# Patient Record
Sex: FEMALE | Race: WHITE | NOT HISPANIC OR LATINO | URBAN - METROPOLITAN AREA
[De-identification: names, ages, dates, MRNs, and addresses within clinical notes are randomized per-mention and may not be internally consistent; named-entity substitution may affect disease eponyms.]

---

## 2024-03-28 DIAGNOSIS — M25.562 LEFT KNEE PAIN: Primary | ICD-10-CM

## 2024-05-06 ENCOUNTER — HOSPITAL ENCOUNTER (OUTPATIENT)
Dept: RADIOLOGY | Facility: CLINIC | Age: 39
Discharge: HOME | End: 2024-05-06
Attending: ORTHOPAEDIC SURGERY
Payer: OTHER MISCELLANEOUS

## 2024-05-06 ENCOUNTER — OFFICE VISIT (OUTPATIENT)
Dept: ORTHOPEDICS | Facility: CLINIC | Age: 39
End: 2024-05-06
Payer: OTHER MISCELLANEOUS

## 2024-05-06 VITALS
BODY MASS INDEX: 27.77 KG/M2 | SYSTOLIC BLOOD PRESSURE: 121 MMHG | WEIGHT: 205 LBS | HEIGHT: 72 IN | DIASTOLIC BLOOD PRESSURE: 83 MMHG | HEART RATE: 111 BPM

## 2024-05-06 DIAGNOSIS — M25.562 LEFT KNEE PAIN: ICD-10-CM

## 2024-05-06 DIAGNOSIS — M23.8X2 CHONDRAL DEFECT OF CONDYLE OF LEFT FEMUR: Primary | ICD-10-CM

## 2024-05-06 PROCEDURE — 73564 X-RAY EXAM KNEE 4 OR MORE: CPT | Mod: LT,,, | Performed by: ORTHOPAEDIC SURGERY

## 2024-05-06 PROCEDURE — 99214 OFFICE O/P EST MOD 30 MIN: CPT | Mod: ,,, | Performed by: ORTHOPAEDIC SURGERY

## 2024-05-06 RX ORDER — NALTREXONE HYDROCHLORIDE 50 MG/1
50 TABLET, FILM COATED ORAL DAILY
COMMUNITY
Start: 2024-03-31

## 2024-05-06 RX ORDER — BUPROPION HYDROCHLORIDE 300 MG/1
300 TABLET ORAL EVERY MORNING
COMMUNITY
Start: 2023-12-15

## 2024-05-06 RX ORDER — CETIRIZINE HYDROCHLORIDE 10 MG/1
10 CAPSULE, LIQUID FILLED ORAL DAILY
COMMUNITY

## 2024-05-06 RX ORDER — TRAZODONE HYDROCHLORIDE 100 MG/1
100 TABLET ORAL NIGHTLY PRN
COMMUNITY
Start: 2024-02-12

## 2024-05-06 RX ORDER — LISDEXAMFETAMINE DIMESYLATE 60 MG/1
60 CAPSULE ORAL EVERY MORNING
COMMUNITY
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
## 2024-05-06 NOTE — LETTER
May 6, 2024       [facility]  [address], [address]  [place] [address]  Phone: [phone]  Fax: [phone]       Patient: Sary Perez   YOB: 1985  Date of Visit: 05/06/2024    To Whom It May Concern:    Vaibhav Perez  was at [facility] on 05/06/2024. The patient may not return to work due to needing surgery on her left knee. If you have any questions or concerns, or if I can be of further assistance, please do not hesitate to contact me.    Sincerely,    Dr Roe Rao MD

## 2024-05-14 ENCOUNTER — TELEPHONE (OUTPATIENT)
Dept: ORTHOPEDICS | Facility: CLINIC | Age: 39
End: 2024-05-14
Payer: OTHER MISCELLANEOUS

## 2024-06-12 ENCOUNTER — OFFICE VISIT (OUTPATIENT)
Dept: ORTHOPEDICS | Facility: CLINIC | Age: 39
End: 2024-06-12
Payer: OTHER MISCELLANEOUS

## 2024-06-12 VITALS
SYSTOLIC BLOOD PRESSURE: 130 MMHG | BODY MASS INDEX: 27.77 KG/M2 | WEIGHT: 205 LBS | HEIGHT: 72 IN | DIASTOLIC BLOOD PRESSURE: 77 MMHG | HEART RATE: 97 BPM

## 2024-06-12 DIAGNOSIS — M23.8X2 CHONDRAL DEFECT OF CONDYLE OF LEFT FEMUR: Primary | ICD-10-CM

## 2024-06-12 PROCEDURE — 99214 OFFICE O/P EST MOD 30 MIN: CPT | Mod: ,,, | Performed by: ORTHOPAEDIC SURGERY

## 2024-06-12 RX ORDER — SODIUM CHLORIDE 9 MG/ML
INJECTION, SOLUTION INTRAVENOUS CONTINUOUS
OUTPATIENT
Start: 2024-06-12

## 2024-06-12 NOTE — H&P (VIEW-ONLY)
Chief Complaint:   Chief Complaint   Patient presents with    Left Knee - Pre-op Exam    Pre-op Exam     Pre-op left knee OATS procedure - Surgery 6/20/24 - W/Comp        Consulting Physician: No ref. provider found    History of present illness:    1/19/2023:  Left knee arthroscopic partial lateral meniscectomy, femoral trochlea chondroplasty (Foret)  5/4/2023:  Left knee arthroscopic partial lateral meniscectomy, trochlea DeNovo (Foret)  11/15/2023: Left knee arthroscopic debridement, partial medial and lateral menisectomy, lateral release (Ambrocio)    she is a pleasant 38 y.o. year old female who initially injured her knee in September of 2022 she fell directly onto her left knee.  She had pain and immediate swelling.  She was initially seen in [place] and treated with the above-mentioned surgeries.  She she also had a 3rd surgery in tibia toe.  Unfortunately she has had continued pain and swelling.  She knows the pain around the kneecap and medially and laterally.  She is tried formal physical therapy and medications without relief.  She denies any new numbness or tingling.  She does have occasional intermittent spontaneous ecchymosis to the medial aspect of the knee.    Past Medical History:   Diagnosis Date    ADD (attention deficit disorder)     Anxiety and depression     Tailbone injury     coccyx       Past Surgical History:   Procedure Laterality Date    HYSTERECTOMY  2012    KNEE SURGERY Left     x3       Current Outpatient Medications   Medication Sig    buPROPion (WELLBUTRIN XL) 300 MG 24 hr tablet Take 300 mg by mouth every morning.    cetirizine (ZYRTEC) 10 mg Cap     naltrexone (DEPADE) 50 mg tablet Take by mouth.    traZODone (DESYREL) 100 MG tablet Take 100 mg by mouth.    VYVANSE 60 mg capsule      No current facility-administered medications for this visit.       Review of patient's allergies indicates:   Allergen Reactions    Cat/feline products Hives, Itching, Other (See Comments), Rash  "and Swelling    Penicillins Anaphylaxis    Hay fever and allergy relief Anxiety, Itching, Other (See Comments), Photosensitivity and Rash       Family History   Problem Relation Name Age of Onset    Alcohol abuse Mother Chasity     Depression Mother Chasity     Drug abuse Mother Chasity     Early death Mother Chasity     Miscarriages / Stillbirths Mother Chasity     Alcohol abuse Father Huey     Drug abuse Father Huey     Alcohol abuse Maternal Grandfather Wesly     Arthritis Maternal Grandmother Carissa     Asthma Maternal Grandmother Carissa     Depression Maternal Grandmother Carissa     Diabetes Maternal Grandmother Carissa     Alcohol abuse Maternal Aunt Noemi     Drug abuse Maternal Aunt Noemi     Alcohol abuse Maternal Uncle Huey     Drug abuse Maternal Uncle Huey        Social History     Socioeconomic History    Marital status: Unknown   Tobacco Use    Smoking status: Never    Smokeless tobacco: Never   Substance and Sexual Activity    Alcohol use: Never    Drug use: Never    Sexual activity: Yes     Partners: Male     Birth control/protection: Other-see comments     Comment: Hystrorectomy       Review of Systems:    Constitution:   Denies chills, fever, and sweats.  HENT:   Denies headaches or blurry vision.  Cardiovascular:  Denies chest pain or irregular heart beat.  Respiratory:   Denies cough or shortness of breath.  Gastrointestinal:  Denies abdominal pain, nausea, or vomiting.  Musculoskeletal:   Denies muscle cramps.  Neurological:   Denies dizziness or focal weakness.  Psychiatric/Behavior: Normal mental status.  Hematology/Lymph:  Denies bleeding problem or easy bruising/bleeding.  Skin:    Denies rash or suspicious lesions.    Examination:    Vital Signs:    Vitals:    06/12/24 1251 06/12/24 1254   BP: 130/77    Pulse: 97    Weight: 93 kg (205 lb 0.4 oz)    Height: 6' 0.01" (1.829 m)    PainSc:    5       Body mass index is 27.8 kg/m².    Constitution:   Well-developed, well nourished patient in no acute " distress.  Neurological:   Alert and oriented x 3 and cooperative to examination.     Psychiatric/Behavior: Normal mental status.  Respiratory:   No shortness of breath.  Cards:   Pulses palpable, brisk cap refill  Eyes:    Extraoccular muscles intact  Skin:    No scars, rash or suspicious lesions.    MSK:   Standing exam  stance: normal alignment, no significant leg-length discrepancy  gait:  Antalgic    Knee examination  - General comments:  Quad atrophy  - Tenderness:  Patella and medial and lateral joint lines    Knee                  RIGHT    LEFT  Skin:                  Intact      Intact  ROM:                 0-130      0-130  Effusion:             Neg        +  MJL TTP:           Neg         +  LJL TTP:            Neg         +  Ramon:         Neg         Neg  Pat crep:            Neg         Neg  Patella TTPs:     Neg         +  Patella grind:      Neg        +  Lachman:           Neg        Neg  Pivot shift:          Neg        Neg  Valgus stress:    Neg        Neg  Varus stress:      Neg        Neg  Posterior drawer: Neg       Neg    N-V intact intact  Hip: nml nml    Lower extremity edema:Negative       Assessment: 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  -     Place in Outpatient; Standing  -     Case Request Operating Room: REPAIR, KNEE, ARTHROSCOPIC, WITH OSTEOCHONDRAL GRAFT TRANSFER - allograft  -     Vital signs; Standing  -     Cleanse with Chlorhexidine (CHG); Standing  -     Diet NPO; Standing  -     Chlorohexidine Gluconate Bath; Standing  -     Full code; Standing  -     Place sequential compression device; Standing    Other orders  -     0.9%  NaCl infusion  -     IP VTE LOW RISK PATIENT; Standing  -     clindamycin (Cleocin) 900 mg in dextrose 5 % (D5W) 50 mL IVPB        Plan:  Recommend left knee arthroscopic assisted osteochondral allograft transplant.  We discussed the details of the procedure and expected postoperative course.  We discussed the benefits of surgery which be to decrease  her pain and increase her function.  We discussed the risks of surgery which is small but could be significant if she has pain, stiffness, or infection.  After discussion she would like to proceed.  Plan for surgery 6/20

## 2024-06-12 NOTE — PROGRESS NOTES
Chief Complaint:   Chief Complaint   Patient presents with    Left Knee - Pre-op Exam    Pre-op Exam     Pre-op left knee OATS procedure - Surgery 6/20/24 - W/Comp        Consulting Physician: No ref. provider found    History of present illness:    1/19/2023:  Left knee arthroscopic partial lateral meniscectomy, femoral trochlea chondroplasty (Foret)  5/4/2023:  Left knee arthroscopic partial lateral meniscectomy, trochlea DeNovo (Foret)  11/15/2023: Left knee arthroscopic debridement, partial medial and lateral menisectomy, lateral release (Ambrocio)    she is a pleasant 38 y.o. year old female who initially injured her knee in September of 2022 she fell directly onto her left knee.  She had pain and immediate swelling.  She was initially seen in [place] and treated with the above-mentioned surgeries.  She she also had a 3rd surgery in tibia toe.  Unfortunately she has had continued pain and swelling.  She knows the pain around the kneecap and medially and laterally.  She is tried formal physical therapy and medications without relief.  She denies any new numbness or tingling.  She does have occasional intermittent spontaneous ecchymosis to the medial aspect of the knee.    Past Medical History:   Diagnosis Date    ADD (attention deficit disorder)     Anxiety and depression     Tailbone injury     coccyx       Past Surgical History:   Procedure Laterality Date    HYSTERECTOMY  2012    KNEE SURGERY Left     x3       Current Outpatient Medications   Medication Sig    buPROPion (WELLBUTRIN XL) 300 MG 24 hr tablet Take 300 mg by mouth every morning.    cetirizine (ZYRTEC) 10 mg Cap     naltrexone (DEPADE) 50 mg tablet Take by mouth.    traZODone (DESYREL) 100 MG tablet Take 100 mg by mouth.    VYVANSE 60 mg capsule      No current facility-administered medications for this visit.       Review of patient's allergies indicates:   Allergen Reactions    Cat/feline products Hives, Itching, Other (See Comments), Rash  "and Swelling    Penicillins Anaphylaxis    Hay fever and allergy relief Anxiety, Itching, Other (See Comments), Photosensitivity and Rash       Family History   Problem Relation Name Age of Onset    Alcohol abuse Mother Chasity     Depression Mother Chasity     Drug abuse Mother Chasity     Early death Mother Chasity     Miscarriages / Stillbirths Mother Chasity     Alcohol abuse Father Huey     Drug abuse Father Huey     Alcohol abuse Maternal Grandfather Wesly     Arthritis Maternal Grandmother Carissa     Asthma Maternal Grandmother Carissa     Depression Maternal Grandmother Carissa     Diabetes Maternal Grandmother Carissa     Alcohol abuse Maternal Aunt Noemi     Drug abuse Maternal Aunt Noemi     Alcohol abuse Maternal Uncle uHey     Drug abuse Maternal Uncle Huey        Social History     Socioeconomic History    Marital status: Unknown   Tobacco Use    Smoking status: Never    Smokeless tobacco: Never   Substance and Sexual Activity    Alcohol use: Never    Drug use: Never    Sexual activity: Yes     Partners: Male     Birth control/protection: Other-see comments     Comment: Hystrorectomy       Review of Systems:    Constitution:   Denies chills, fever, and sweats.  HENT:   Denies headaches or blurry vision.  Cardiovascular:  Denies chest pain or irregular heart beat.  Respiratory:   Denies cough or shortness of breath.  Gastrointestinal:  Denies abdominal pain, nausea, or vomiting.  Musculoskeletal:   Denies muscle cramps.  Neurological:   Denies dizziness or focal weakness.  Psychiatric/Behavior: Normal mental status.  Hematology/Lymph:  Denies bleeding problem or easy bruising/bleeding.  Skin:    Denies rash or suspicious lesions.    Examination:    Vital Signs:    Vitals:    06/12/24 1251 06/12/24 1254   BP: 130/77    Pulse: 97    Weight: 93 kg (205 lb 0.4 oz)    Height: 6' 0.01" (1.829 m)    PainSc:    5       Body mass index is 27.8 kg/m².    Constitution:   Well-developed, well nourished patient in no acute " distress.  Neurological:   Alert and oriented x 3 and cooperative to examination.     Psychiatric/Behavior: Normal mental status.  Respiratory:   No shortness of breath.  Cards:   Pulses palpable, brisk cap refill  Eyes:    Extraoccular muscles intact  Skin:    No scars, rash or suspicious lesions.    MSK:   Standing exam  stance: normal alignment, no significant leg-length discrepancy  gait:  Antalgic    Knee examination  - General comments:  Quad atrophy  - Tenderness:  Patella and medial and lateral joint lines    Knee                  RIGHT    LEFT  Skin:                  Intact      Intact  ROM:                 0-130      0-130  Effusion:             Neg        +  MJL TTP:           Neg         +  LJL TTP:            Neg         +  Ramon:         Neg         Neg  Pat crep:            Neg         Neg  Patella TTPs:     Neg         +  Patella grind:      Neg        +  Lachman:           Neg        Neg  Pivot shift:          Neg        Neg  Valgus stress:    Neg        Neg  Varus stress:      Neg        Neg  Posterior drawer: Neg       Neg    N-V intact intact  Hip: nml nml    Lower extremity edema:Negative       Assessment: 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  -     Place in Outpatient; Standing  -     Case Request Operating Room: REPAIR, KNEE, ARTHROSCOPIC, WITH OSTEOCHONDRAL GRAFT TRANSFER - allograft  -     Vital signs; Standing  -     Cleanse with Chlorhexidine (CHG); Standing  -     Diet NPO; Standing  -     Chlorohexidine Gluconate Bath; Standing  -     Full code; Standing  -     Place sequential compression device; Standing    Other orders  -     0.9%  NaCl infusion  -     IP VTE LOW RISK PATIENT; Standing  -     clindamycin (Cleocin) 900 mg in dextrose 5 % (D5W) 50 mL IVPB        Plan:  Recommend left knee arthroscopic assisted osteochondral allograft transplant.  We discussed the details of the procedure and expected postoperative course.  We discussed the benefits of surgery which be to decrease  her pain and increase her function.  We discussed the risks of surgery which is small but could be significant if she has pain, stiffness, or infection.  After discussion she would like to proceed.  Plan for surgery 6/20

## 2024-06-14 ENCOUNTER — ANESTHESIA EVENT (OUTPATIENT)
Dept: SURGERY | Facility: HOSPITAL | Age: 39
End: 2024-06-14
Payer: OTHER MISCELLANEOUS

## 2024-06-20 ENCOUNTER — ANESTHESIA (OUTPATIENT)
Dept: SURGERY | Facility: HOSPITAL | Age: 39
End: 2024-06-20
Payer: OTHER MISCELLANEOUS

## 2024-06-20 ENCOUNTER — HOSPITAL ENCOUNTER (OUTPATIENT)
Facility: HOSPITAL | Age: 39
Discharge: HOME | End: 2024-06-20
Attending: ORTHOPAEDIC SURGERY | Admitting: ORTHOPAEDIC SURGERY
Payer: OTHER MISCELLANEOUS

## 2024-06-20 DIAGNOSIS — M23.8X2 CHONDRAL DEFECT OF CONDYLE OF LEFT FEMUR: Primary | ICD-10-CM

## 2024-06-20 PROCEDURE — 71000015 HC POSTOP RECOV 1ST HR: Performed by: ORTHOPAEDIC SURGERY

## 2024-06-20 PROCEDURE — 63600175 PHARM REV CODE 636 W HCPCS: Performed by: ANESTHESIOLOGY

## 2024-06-20 PROCEDURE — 36000710: Performed by: ORTHOPAEDIC SURGERY

## 2024-06-20 PROCEDURE — 63600175 PHARM REV CODE 636 W HCPCS: Performed by: NURSE ANESTHETIST, CERTIFIED REGISTERED

## 2024-06-20 PROCEDURE — 27201423 OPTIME MED/SURG SUP & DEVICES STERILE SUPPLY: Performed by: ORTHOPAEDIC SURGERY

## 2024-06-20 PROCEDURE — 63600175 PHARM REV CODE 636 W HCPCS: Performed by: ORTHOPAEDIC SURGERY

## 2024-06-20 PROCEDURE — C1713 ANCHOR/SCREW BN/BN,TIS/BN: HCPCS | Performed by: ORTHOPAEDIC SURGERY

## 2024-06-20 PROCEDURE — 37000008 HC ANESTHESIA 1ST 15 MINUTES: Performed by: ORTHOPAEDIC SURGERY

## 2024-06-20 PROCEDURE — 63600175 PHARM REV CODE 636 W HCPCS: Mod: JZ,JG | Performed by: NURSE PRACTITIONER

## 2024-06-20 PROCEDURE — 36000711: Performed by: ORTHOPAEDIC SURGERY

## 2024-06-20 PROCEDURE — 29867 ALLGRFT IMPLNT KNEE W/SCOPE: CPT | Mod: AS,LT,, | Performed by: NURSE PRACTITIONER

## 2024-06-20 PROCEDURE — 64447 NJX AA&/STRD FEMORAL NRV IMG: CPT | Performed by: ANESTHESIOLOGY

## 2024-06-20 PROCEDURE — 29867 ALLGRFT IMPLNT KNEE W/SCOPE: CPT | Mod: LT,,, | Performed by: ORTHOPAEDIC SURGERY

## 2024-06-20 PROCEDURE — 25000003 PHARM REV CODE 250: Performed by: ORTHOPAEDIC SURGERY

## 2024-06-20 PROCEDURE — 71000016 HC POSTOP RECOV ADDL HR: Performed by: ORTHOPAEDIC SURGERY

## 2024-06-20 PROCEDURE — 25000003 PHARM REV CODE 250: Performed by: NURSE ANESTHETIST, CERTIFIED REGISTERED

## 2024-06-20 PROCEDURE — 37000009 HC ANESTHESIA EA ADD 15 MINS: Performed by: ORTHOPAEDIC SURGERY

## 2024-06-20 PROCEDURE — 63600175 PHARM REV CODE 636 W HCPCS

## 2024-06-20 PROCEDURE — 71000033 HC RECOVERY, INTIAL HOUR: Performed by: ORTHOPAEDIC SURGERY

## 2024-06-20 RX ORDER — PROMETHAZINE HYDROCHLORIDE 25 MG/1
25 TABLET ORAL EVERY 6 HOURS PRN
Status: DISCONTINUED | OUTPATIENT
Start: 2024-06-20 | End: 2024-06-20 | Stop reason: HOSPADM

## 2024-06-20 RX ORDER — HYDROMORPHONE HYDROCHLORIDE 2 MG/ML
0.4 INJECTION, SOLUTION INTRAMUSCULAR; INTRAVENOUS; SUBCUTANEOUS EVERY 5 MIN PRN
Status: DISCONTINUED | OUTPATIENT
Start: 2024-06-20 | End: 2024-06-20 | Stop reason: HOSPADM

## 2024-06-20 RX ORDER — SODIUM CHLORIDE 0.9 % (FLUSH) 0.9 %
3 SYRINGE (ML) INJECTION EVERY 6 HOURS PRN
Status: DISCONTINUED | OUTPATIENT
Start: 2024-06-20 | End: 2024-06-20 | Stop reason: HOSPADM

## 2024-06-20 RX ORDER — DEXAMETHASONE SODIUM PHOSPHATE 4 MG/ML
INJECTION, SOLUTION INTRA-ARTICULAR; INTRALESIONAL; INTRAMUSCULAR; INTRAVENOUS; SOFT TISSUE
Status: DISCONTINUED | OUTPATIENT
Start: 2024-06-20 | End: 2024-06-20

## 2024-06-20 RX ORDER — MORPHINE SULFATE 4 MG/ML
3 INJECTION, SOLUTION INTRAMUSCULAR; INTRAVENOUS
Status: DISCONTINUED | OUTPATIENT
Start: 2024-06-20 | End: 2024-06-20 | Stop reason: HOSPADM

## 2024-06-20 RX ORDER — ROPIVACAINE HYDROCHLORIDE 5 MG/ML
30 INJECTION, SOLUTION EPIDURAL; INFILTRATION; PERINEURAL ONCE
Status: DISCONTINUED | OUTPATIENT
Start: 2024-06-20 | End: 2024-06-20 | Stop reason: HOSPADM

## 2024-06-20 RX ORDER — METHOCARBAMOL 750 MG/1
750 TABLET, FILM COATED ORAL 3 TIMES DAILY
Qty: 21 TABLET | Refills: 0 | Status: SHIPPED | OUTPATIENT
Start: 2024-06-20

## 2024-06-20 RX ORDER — HYDROMORPHONE HYDROCHLORIDE 2 MG/ML
INJECTION, SOLUTION INTRAMUSCULAR; INTRAVENOUS; SUBCUTANEOUS
Status: COMPLETED
Start: 2024-06-20 | End: 2024-06-20

## 2024-06-20 RX ORDER — LIDOCAINE HYDROCHLORIDE 20 MG/ML
INJECTION, SOLUTION EPIDURAL; INFILTRATION; INTRACAUDAL; PERINEURAL
Status: DISCONTINUED | OUTPATIENT
Start: 2024-06-20 | End: 2024-06-20

## 2024-06-20 RX ORDER — CLINDAMYCIN PHOSPHATE 900 MG/50ML
900 INJECTION, SOLUTION INTRAVENOUS
Status: COMPLETED | OUTPATIENT
Start: 2024-06-20 | End: 2024-06-20

## 2024-06-20 RX ORDER — PROPOFOL 10 MG/ML
INJECTION, EMULSION INTRAVENOUS
Status: DISCONTINUED | OUTPATIENT
Start: 2024-06-20 | End: 2024-06-20

## 2024-06-20 RX ORDER — EPINEPHRINE 1 MG/ML
INJECTION, SOLUTION, CONCENTRATE INTRAVENOUS
Status: DISCONTINUED | OUTPATIENT
Start: 2024-06-20 | End: 2024-06-20 | Stop reason: HOSPADM

## 2024-06-20 RX ORDER — GLYCOPYRROLATE 0.2 MG/ML
INJECTION INTRAMUSCULAR; INTRAVENOUS
Status: DISCONTINUED | OUTPATIENT
Start: 2024-06-20 | End: 2024-06-20

## 2024-06-20 RX ORDER — HYDROCODONE BITARTRATE AND ACETAMINOPHEN 5; 325 MG/1; MG/1
1 TABLET ORAL EVERY 4 HOURS PRN
Status: DISCONTINUED | OUTPATIENT
Start: 2024-06-20 | End: 2024-06-20 | Stop reason: HOSPADM

## 2024-06-20 RX ORDER — MIDAZOLAM HYDROCHLORIDE 1 MG/ML
2 INJECTION INTRAMUSCULAR; INTRAVENOUS ONCE
Status: COMPLETED | OUTPATIENT
Start: 2024-06-20 | End: 2024-06-20

## 2024-06-20 RX ORDER — VANCOMYCIN HYDROCHLORIDE 500 MG/10ML
INJECTION, POWDER, LYOPHILIZED, FOR SOLUTION INTRAVENOUS
Status: DISCONTINUED | OUTPATIENT
Start: 2024-06-20 | End: 2024-06-20 | Stop reason: HOSPADM

## 2024-06-20 RX ORDER — ROPIVACAINE HYDROCHLORIDE 5 MG/ML
INJECTION, SOLUTION EPIDURAL; INFILTRATION; PERINEURAL
Status: COMPLETED | OUTPATIENT
Start: 2024-06-20 | End: 2024-06-20

## 2024-06-20 RX ORDER — TRAMADOL HYDROCHLORIDE 50 MG/1
50 TABLET ORAL EVERY 4 HOURS PRN
Status: DISCONTINUED | OUTPATIENT
Start: 2024-06-20 | End: 2024-06-20 | Stop reason: HOSPADM

## 2024-06-20 RX ORDER — KETOROLAC TROMETHAMINE 10 MG/1
10 TABLET, FILM COATED ORAL 3 TIMES DAILY
Qty: 15 TABLET | Refills: 0 | Status: SHIPPED | OUTPATIENT
Start: 2024-06-20

## 2024-06-20 RX ORDER — KETOROLAC TROMETHAMINE 30 MG/ML
INJECTION, SOLUTION INTRAMUSCULAR; INTRAVENOUS
Status: DISCONTINUED | OUTPATIENT
Start: 2024-06-20 | End: 2024-06-20

## 2024-06-20 RX ORDER — MIDAZOLAM HYDROCHLORIDE 1 MG/ML
INJECTION INTRAMUSCULAR; INTRAVENOUS
Status: DISCONTINUED | OUTPATIENT
Start: 2024-06-20 | End: 2024-06-20

## 2024-06-20 RX ORDER — ONDANSETRON HYDROCHLORIDE 2 MG/ML
INJECTION, SOLUTION INTRAVENOUS
Status: DISCONTINUED | OUTPATIENT
Start: 2024-06-20 | End: 2024-06-20

## 2024-06-20 RX ORDER — OXYCODONE AND ACETAMINOPHEN 5; 325 MG/1; MG/1
1 TABLET ORAL EVERY 6 HOURS PRN
Qty: 20 TABLET | Refills: 0 | Status: SHIPPED | OUTPATIENT
Start: 2024-06-20 | End: 2024-06-26 | Stop reason: SDUPTHER

## 2024-06-20 RX ORDER — FENTANYL CITRATE 50 UG/ML
INJECTION, SOLUTION INTRAMUSCULAR; INTRAVENOUS
Status: DISCONTINUED | OUTPATIENT
Start: 2024-06-20 | End: 2024-06-20

## 2024-06-20 RX ORDER — ONDANSETRON HYDROCHLORIDE 2 MG/ML
4 INJECTION, SOLUTION INTRAVENOUS EVERY 12 HOURS PRN
Status: DISCONTINUED | OUTPATIENT
Start: 2024-06-20 | End: 2024-06-20 | Stop reason: HOSPADM

## 2024-06-20 RX ORDER — SODIUM CHLORIDE 9 MG/ML
INJECTION, SOLUTION INTRAVENOUS CONTINUOUS
Status: DISCONTINUED | OUTPATIENT
Start: 2024-06-20 | End: 2024-06-20 | Stop reason: HOSPADM

## 2024-06-20 RX ORDER — MIDAZOLAM HYDROCHLORIDE 2 MG/2ML
INJECTION, SOLUTION INTRAMUSCULAR; INTRAVENOUS
Status: COMPLETED
Start: 2024-06-20 | End: 2024-06-20

## 2024-06-20 RX ORDER — MUPIROCIN 20 MG/G
OINTMENT TOPICAL 2 TIMES DAILY
Status: DISCONTINUED | OUTPATIENT
Start: 2024-06-20 | End: 2024-06-20 | Stop reason: HOSPADM

## 2024-06-20 RX ORDER — VANCOMYCIN HYDROCHLORIDE 1 G/20ML
INJECTION, POWDER, LYOPHILIZED, FOR SOLUTION INTRAVENOUS
Status: DISCONTINUED
Start: 2024-06-20 | End: 2024-06-20 | Stop reason: HOSPADM

## 2024-06-20 RX ADMIN — MIDAZOLAM HYDROCHLORIDE 2 MG: 1 INJECTION, SOLUTION INTRAMUSCULAR; INTRAVENOUS at 09:06

## 2024-06-20 RX ADMIN — DEXAMETHASONE SODIUM PHOSPHATE 8 MG: 4 INJECTION, SOLUTION INTRA-ARTICULAR; INTRALESIONAL; INTRAMUSCULAR; INTRAVENOUS; SOFT TISSUE at 10:06

## 2024-06-20 RX ADMIN — KETOROLAC TROMETHAMINE 30 MG: 30 INJECTION, SOLUTION INTRAMUSCULAR at 11:06

## 2024-06-20 RX ADMIN — LIDOCAINE HYDROCHLORIDE 50 MG: 20 INJECTION, SOLUTION EPIDURAL; INFILTRATION; INTRACAUDAL; PERINEURAL at 10:06

## 2024-06-20 RX ADMIN — GLYCOPYRROLATE 0.1 MG: 0.2 INJECTION INTRAMUSCULAR; INTRAVENOUS at 10:06

## 2024-06-20 RX ADMIN — ONDANSETRON HYDROCHLORIDE 4 MG: 2 SOLUTION INTRAMUSCULAR; INTRAVENOUS at 11:06

## 2024-06-20 RX ADMIN — PROPOFOL 200 MG: 10 INJECTION, EMULSION INTRAVENOUS at 10:06

## 2024-06-20 RX ADMIN — HYDROMORPHONE HYDROCHLORIDE 0.4 MG: 2 INJECTION, SOLUTION INTRAMUSCULAR; INTRAVENOUS; SUBCUTANEOUS at 12:06

## 2024-06-20 RX ADMIN — HYDROMORPHONE HYDROCHLORIDE 2 MG: 2 INJECTION, SOLUTION INTRAMUSCULAR; INTRAVENOUS; SUBCUTANEOUS at 12:06

## 2024-06-20 RX ADMIN — ROPIVACAINE HYDROCHLORIDE 25 ML: 5 INJECTION, SOLUTION EPIDURAL; INFILTRATION; PERINEURAL at 09:06

## 2024-06-20 RX ADMIN — MIDAZOLAM HYDROCHLORIDE 2 MG: 1 INJECTION INTRAMUSCULAR; INTRAVENOUS at 09:06

## 2024-06-20 RX ADMIN — CLINDAMYCIN PHOSPHATE 900 MG: 900 INJECTION, SOLUTION INTRAVENOUS at 10:06

## 2024-06-20 RX ADMIN — FENTANYL CITRATE 50 MCG: 50 INJECTION, SOLUTION INTRAMUSCULAR; INTRAVENOUS at 11:06

## 2024-06-20 RX ADMIN — SODIUM CHLORIDE, SODIUM GLUCONATE, SODIUM ACETATE, POTASSIUM CHLORIDE AND MAGNESIUM CHLORIDE: 526; 502; 368; 37; 30 INJECTION, SOLUTION INTRAVENOUS at 10:06

## 2024-06-20 RX ADMIN — HYDROCODONE BITARTRATE AND ACETAMINOPHEN 1 TABLET: 5; 325 TABLET ORAL at 12:06

## 2024-06-20 RX ADMIN — MIDAZOLAM 2 MG: 1 INJECTION INTRAMUSCULAR; INTRAVENOUS at 10:06

## 2024-06-20 NOTE — ANESTHESIA PROCEDURE NOTES
Intubation    Date/Time: 6/20/2024 10:43 AM    Performed by: Vincent Chang CRNA  Authorized by: Felipe Corley MD    Intubation:     Induction:  Intravenous    Intubated:  Postinduction    Mask Ventilation:  Easy with oral airway    Attempts:  1    Attempted By:  CRNA    Difficult Airway Encountered?: No      Complications:  None    Airway Device:  Supraglottic airway/LMA    Airway Device Size:  3.0    Style/Cuff Inflation:  Cuffed    Secured at:  The lips    Placement Verified By:  Capnometry    Complicating Factors:  None

## 2024-06-20 NOTE — ANESTHESIA PREPROCEDURE EVALUATION
06/20/2024  Sary Perez is a 38 y.o., female.  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     I have reviewed the Nursing Notes. I have reviewed the NPO Status.   I have reviewed the Medications.     Review of Systems  Anesthesia Hx:  No problems with previous Anesthesia                Hematology/Oncology:  Hematology Normal   Oncology Normal                                   EENT/Dental:  EENT/Dental Normal           Cardiovascular:  Cardiovascular Normal Exercise tolerance: good                   Functional Capacity good / => 4 METS                         Pulmonary:  Pulmonary Normal                       Renal/:   Denies Chronic Renal Disease.                Hepatic/GI:  Hepatic/GI Normal                 Musculoskeletal:  Musculoskeletal Normal                Neurological:  Neurology Normal                                      Endocrine:  Endocrine Normal          Denies Morbid Obesity / BMI > 40  Dermatological:  Skin Normal    Psych:  Psychiatric Normal                    Physical Exam  General: Alert, Oriented, Well nourished and Cooperative    Airway:  Mallampati: II   Mouth Opening: Normal  TM Distance: Normal  Tongue: Normal  Neck ROM: Normal ROM    Dental:  Intact    Chest/Lungs:  Clear to auscultation, Normal Respiratory Rate    Heart:  Rate: Normal  Rhythm: Regular Rhythm        Anesthesia Plan  Type of Anesthesia, risks & benefits discussed:    Anesthesia Type: Gen  Supraglottic Airway  Intra-op Monitoring Plan: Standard ASA Monitors  Post Op Pain Control Plan: multimodal analgesia  Induction:  IV and Inhalation  Airway Plan: Direct  Informed Consent: Informed consent signed with the Patient and all parties understand the risks and agree with anesthesia plan.  All questions answered. Patient consented to blood products? Yes  ASA Score: 1  Day of Surgery Review of History & Physical: H&P Update referred to the surgeon/provider.I have interviewed and examined the patient. I have reviewed the patient's H&P dated: There are no significant changes.   Anesthesia Plan Notes: Lft adductor canal blk for po pain controll    Ready For Surgery From Anesthesia Perspective.     .

## 2024-06-20 NOTE — OP NOTE
DATE OF SERVICE: 06/20/2024    SURGEON: Roe Rao MD     ASSISTANT: Abi Currie NP. Mrs. Currie was essential in manipulating the leg while I performed the arthroscopy, retraction, and closure.    PREOPERATIVE DIAGNOSIS:   Left knee trochlea osteochondral defect    POSTOPERATIVE DIAGNOSIS:   Left knee trochlea osteochondral defect, patella chondromalacia,     PROCEDURE PERFORMED:  1. Left knee arthroscopic assisted osteochodral allograft transfer  2. Left knee arthroscopic shaving chondroplasty of patella    ESTIMATED BLOOD LOSS: 10cc.    COMPLICATIONS: None.    TOURNIQUET TIME: 40 minutes.    ANTIBIOTICS: Clindamycin     INDICATIONS FOR PROCEDURE: Sary Perez is a 38 y.o. year old who has had ongoing left knee pain. The patient has had to limit activity due to intermittent pain & occasional mechanical symptoms. An MRI was performed that showed a persistent chondral defect to the trochlea. The patient decided to opt for surgery after failing conservative management.    OPERATIVE REPORT: Sary Perez was initially seen in the preoperative holding area where history and physical were reviewed without change. The operative leg was marked, consents were reviewed, and any questions were answered for the patient and family. The patient was then taken back to the operating room, placed supine on the operating table with all bony prominences padded. Then a nonsterile tourniquet was placed around the upper thigh. The patient was induced under general anesthesia. The operative lower extremity was prepped and draped in standard sterile fashion. A timeout led by the surgeon was performed, and preoperative antibiotics were given. The limp was exsanguinated with gravity. The tourniquet was raised to 100 mmHg over the systolic blood pressure.    The knee was then flexed and the inferolateral portal was created with an #11 blade scalpel.    The camera was introduced, using the blunt trocar, into the suprapatellar pouch.  The undersurface of the patella was found to have a chondral flap at the inferior pole and the trochlear groove was found to have 10 mm focal grade 3/4 chondral wear . The camera was then taken down into the lateral gutter, where no loose bodies and no plica were found. The camera was then brought into the medial gutter, where no loose bodies and no plica were seen. The camera was then brought into the medial compartment.    An inferomedial portal was then localized with a spinal needle, and created using an #11 blade. The medial meniscus was probed and found to have a no tears. The medial femoral condyle was found to have scattered grade 1/2 wear. The medial tibial plateau demonstrated no wear.    The camera was then turned to the notch, where the ACL was found to be intact.    Then the leg was brought into figure-of-four position. The camera was brought into the lateral compartment. The lateral meniscus was probed and found to be status post menisectomy with no residual tear. The lateral femoral condyle was found to have no chondral wear. The lateral tibial plateau had no chondral wear.    The camera was brought up into the patellofemoral joint once more, with the knee in extension and shaving chondroplasty was performed on the patella until smooth and stable cartilage margins were present.    I then was able to localize the defect of the trochlea using a spinal needle and made approximately 2 cm incision in line with the defect.  I split the patella tendon and then I introduced the Reamer into the knee.  I was able to ream 10 mm in diameter and 10 mm in length which corresponded to the length of the plug.  I then used a Pulsavac in order to clean the plug appropriately and then deployed the plug into the defect using arthroscopic assistance.  The plug was probed and stable.    The knee was once more examined for loose bodies, of which none were found. The knee was then evacuated of all fluids. The portals were  closed with 3-0 monocyrl interrupted sutures and steristrips. 10mL of 0.25% Bupivicaine were infiltrated around each portal. A sterile dressing was placed, and the tourniquet was deflated after 40 minutes. The patient was awakened from anesthesia and taken to the postoperative care unit in stable condition.

## 2024-06-20 NOTE — ANESTHESIA POSTPROCEDURE EVALUATION
Anesthesia Post Evaluation    Patient: Sary Perez    Procedure(s) Performed: Procedure(s) (LRB):  REPAIR, KNEE, ARTHROSCOPIC, WITH OSTEOCHONDRAL GRAFT TRANSFER - allograft (Left)    Final Anesthesia Type: general      Patient location during evaluation: PACU  Patient participation: Yes- Able to Participate  Level of consciousness: awake and alert and oriented  Post-procedure vital signs: reviewed and stable  Pain management: adequate  Airway patency: patent  ANAMARIA mitigation strategies: Verification of full reversal of neuromuscular block  PONV status at discharge: No PONV  Anesthetic complications: no      Cardiovascular status: blood pressure returned to baseline and stable  Respiratory status: spontaneous ventilation and unassisted  Hydration status: euvolemic  Follow-up not needed.  Comments: [facility]              Vitals Value Taken Time   /73 06/20/24 1231   Temp  06/20/24 1243   Pulse 69 06/20/24 1242   Resp 18 06/20/24 1230   SpO2 100 % 06/20/24 1242   Vitals shown include unfiled device data.      Event Time   Out of Recovery 12:29:00         Pain/Vidhi Score: Pain Rating Prior to Med Admin: 7 (6/20/2024 12:10 PM)  Vidhi Score: 10 (6/20/2024 12:20 PM)

## 2024-06-20 NOTE — DISCHARGE SUMMARY
[facility] - Periop Services  Discharge Note  Short Stay    Procedure(s) (LRB):  REPAIR, KNEE, ARTHROSCOPIC, WITH OSTEOCHONDRAL GRAFT TRANSFER - allograft (Left)      OUTCOME: Patient tolerated treatment/procedure well without complication and is now ready for discharge.    DISPOSITION: Home or Self Care    FINAL DIAGNOSIS:  <principal problem not specified>    FOLLOWUP: In clinic    DISCHARGE INSTRUCTIONS:  No discharge procedures on file.     TIME SPENT ON DISCHARGE: 10 minutes

## 2024-06-20 NOTE — DISCHARGE INSTRUCTIONS
[facility]  Roe Rao Jr., MD  [address], [address],   [place] [address]  [phone], fax [phone]    CARTILAGE TRANSPLANT    DIET:  Following general anesthesia, start with clear liquids to decrease chances of nausea.  Begin with water, coffee, tea, ginger ale, sprite, or apple juice.  If tolerated, advance to Jell-o, soup, crackers, or toast.  Once these are tolerated, advance to a regular diet.    DRESSING:  Keep the dressing clean and dry.  Remove the dressing on the 3rd day after surgery and replace the gauze with bandaids.  If you have steri-strips or band-aids in place of stitches, allow them to stay in place as long as possible.  They usually fall off on their own within 7-10 days.  You may trim the edges as the steri-strips begin to curl.  Steri-strips can get wet in the shower-pat dry with a towel after showers.    SHOWERING:  You may shower 5 days after surgery.  Remove the dressing or band-aids before showering.  Leave the incisions open to air after showering.  You can cover the sutures with band-aids if clothing irritates the stitches.  You do not need to reapply a dressing, but you may do so if you continue to have drainage.  It is not uncommon to have drainage a few days after surgery.      ACTIVITY:  -  Ice should be applied to the knee for 30 minutes, 5-6 times per day, for the 1st week to help decrease pain and swelling.  After the first week, apply as needed, especially after exercises and physical therapy.  -  Elevate the knee with 2-3 pillows under the ANKLE.  Do not elevate with pillows under the knee, this will keep the knee in a bent position.  It is important that the knee can be fully straightened in the early post op period.  -  Use crutches following surgery  -  You will begin to bear weight on your leg with therapy.      PAIN MEDICATION:  -  Use the Percocet as prescribed for pain after surgery.  Pain medicine can cause nausea  and vomiting, especially on an empty stomach.  -  In addition, you can take Ketorolac as prescribed or Iburpofen 200 mg, 4 pills every 8 hours.  Ketorolac or Iburpofen medicine can irritate your stomach or cause heartburn.  If this happens to you, stop taking the medicine.  -  In addition, you can take Robaxin to help with muscle spasms.  -  Ice and elevation are more useful than pain medicine for surgical pain.  If you are having too much pain or discomfort, try more ice and higher elevation.  -  Do NOT drink alcohol while taking pain medication.    BLOOD CLOT PREVENTION:  If you are aware that you are at high risk for blood blots, notify your physician.  In general, you should walk s much as possible after surgery to increase blood circulation throughout your body. Please take Aspirin 81 mg, 1 pill twice a day for 2 weeks to help further prevent blood clots.    DRIVING OR FLYING:  You must NEVER drive while taking narcotic pain medication  You may ride in a car, take a train, or fly once you feel comfortable    PHYSICAL THERAPY:  Physical therapy will contact you 1-2 days after surgery to schedule a rehab appointment.  All exercises and activities must be within the protocol until rehab is complete.      WORK OR SCHOOL:  You may return to an office job or school whenever comfortable.  Most patients return ~ 1 week after surgery.  For more active jobs that require extended walking, squatting, or lifting, you can wait until after your follow up appointment.  Any other types of jobs should be discussed with Dr. Rao to determine a date for return to work.    PROBLEMS TO REPORT:       1.  Fever greater than 102 F       2.  Incision that is very red and/or draining pus       3.  Unable to urinate within 8 hours of surgery (a rare effect of being put to sleep for surgery)  Calls should be directed to the clinic:  [phone]    RETURN APPOINTMENT:  To confirm or reschedule your appointment, call [phone]

## 2024-06-20 NOTE — TRANSFER OF CARE
Anesthesia Transfer of Care Note    Patient: Sary Perez    Procedure(s) Performed: Procedure(s) (LRB):  REPAIR, KNEE, ARTHROSCOPIC, WITH OSTEOCHONDRAL GRAFT TRANSFER - allograft (Left)    Patient location: PACU    Anesthesia Type: general    Transport from OR: Transported from OR on room air with adequate spontaneous ventilation    Post pain: adequate analgesia    Post assessment: no apparent anesthetic complications    Post vital signs: stable    Level of consciousness: sedated    Nausea/Vomiting: no nausea/vomiting    Complications: none    Transfer of care protocol was followed      Last vitals: Visit Vitals  /72   Pulse 72   Temp 36.2 °C (97.1 °F) (Tympanic)   Resp 16   Ht 6' (1.829 m)   Wt 93.2 kg (205 lb 7.5 oz)   LMP  (LMP Unknown)   SpO2 100%   Breastfeeding No   BMI 27.87 kg/m²

## 2024-06-20 NOTE — ANESTHESIA PROCEDURE NOTES
Peripheral Block/LFT ADDUCTOR CANAL BLK    Patient location during procedure: pre-op   Block not for primary anesthetic.  Reason for block: at surgeon's request and post-op pain management   Post-op Pain Location: lft knee   Start time: 6/20/2024 9:17 AM  Timeout: 6/20/2024 9:14 AM   End time: 6/20/2024 9:18 AM    Staffing  Authorizing Provider: Felipe Corley MD  Performing Provider: Felipe Corley MD    Staffing  Performed by: Felipe Corley MD  Authorized by: Felipe Corley MD    Preanesthetic Checklist  Completed: patient identified, IV checked, site marked, risks and benefits discussed, surgical consent, monitors and equipment checked, pre-op evaluation and timeout performed  Peripheral Block  Patient position: supine  Prep: ChloraPrep and site prepped and draped  Patient monitoring: heart rate, cardiac monitor, continuous pulse ox, continuous capnometry and frequent blood pressure checks  Block type: adductor canal  Laterality: left  Injection technique: single shot  Needle  Needle type: Stimuplex   Needle gauge: 22 G  Needle length: 2 in  Needle localization: anatomical landmarks and ultrasound guidance  Catheter type: spring wound  Catheter size: 19 G  Test dose: lidocaine 1.5% with Epi 1-to-200,000 and negative   -ultrasound image captured on disc.  Assessment  Injection assessment: negative aspiration, negative parasthesia and local visualized surrounding nerve  Paresthesia pain: none  Heart rate change: no  Slow fractionated injection: yes  Pain Tolerance: comfortable throughout block and no complaints  Medications:    Medications: ropivacaine (NAROPIN) injection 0.5% - Perineural, Other   25 mL - 6/20/2024 9:17:00 AM    Additional Notes  VSS TOLERATED PROCEDURE WELL

## 2024-06-20 NOTE — PLAN OF CARE
Pt ready for discharge. Tolerated procedure well.  Problem: Adult Inpatient Plan of Care  Goal: Plan of Care Review  Outcome: Met  Goal: Patient-Specific Goal (Individualized)  Outcome: Met  Goal: Absence of Hospital-Acquired Illness or Injury  Outcome: Met  Goal: Optimal Comfort and Wellbeing  Outcome: Met  Goal: Readiness for Transition of Care  Outcome: Met     Problem: Wound  Goal: Optimal Coping  Outcome: Met  Goal: Optimal Functional Ability  Outcome: Met  Goal: Absence of Infection Signs and Symptoms  Outcome: Met  Goal: Improved Oral Intake  Outcome: Met  Goal: Optimal Pain Control and Function  Outcome: Met  Goal: Skin Health and Integrity  Outcome: Met  Goal: Optimal Wound Healing  Outcome: Met

## 2024-06-21 VITALS
SYSTOLIC BLOOD PRESSURE: 111 MMHG | HEART RATE: 72 BPM | WEIGHT: 205.5 LBS | HEIGHT: 72 IN | DIASTOLIC BLOOD PRESSURE: 75 MMHG | RESPIRATION RATE: 18 BRPM | BODY MASS INDEX: 27.83 KG/M2 | OXYGEN SATURATION: 100 % | TEMPERATURE: 97 F

## 2024-06-26 DIAGNOSIS — Z98.890 S/P ARTHROSCOPIC KNEE SURGERY: Primary | ICD-10-CM

## 2024-06-26 RX ORDER — OXYCODONE AND ACETAMINOPHEN 5; 325 MG/1; MG/1
1 TABLET ORAL EVERY 6 HOURS PRN
Qty: 28 TABLET | Refills: 0 | Status: SHIPPED | OUTPATIENT
Start: 2024-06-26

## 2024-06-27 ENCOUNTER — TELEPHONE (OUTPATIENT)
Dept: ORTHOPEDICS | Facility: CLINIC | Age: 39
End: 2024-06-27
Payer: OTHER MISCELLANEOUS

## 2024-06-27 NOTE — TELEPHONE ENCOUNTER
Patient called with numbness - Left chin into foot and toes. Spoke to Etier feels like the numbness may be residual from the block and feels like this will subside. Patient notified and was okay with giving it more time.     Advised patient to call me if symptoms change or she needs to come sooner than her scheduled appointment.

## 2024-07-08 ENCOUNTER — HOSPITAL ENCOUNTER (OUTPATIENT)
Dept: RADIOLOGY | Facility: CLINIC | Age: 39
Discharge: HOME | End: 2024-07-08
Attending: ORTHOPAEDIC SURGERY

## 2024-07-08 ENCOUNTER — OFFICE VISIT (OUTPATIENT)
Dept: ORTHOPEDICS | Facility: CLINIC | Age: 39
End: 2024-07-08
Payer: OTHER MISCELLANEOUS

## 2024-07-08 VITALS
DIASTOLIC BLOOD PRESSURE: 83 MMHG | WEIGHT: 205.5 LBS | SYSTOLIC BLOOD PRESSURE: 121 MMHG | HEIGHT: 72 IN | BODY MASS INDEX: 27.83 KG/M2 | HEART RATE: 96 BPM

## 2024-07-08 DIAGNOSIS — M23.8X1 CHONDRAL DEFECT OF CONDYLE OF RIGHT FEMUR: Primary | ICD-10-CM

## 2024-07-08 DIAGNOSIS — G89.29 CHRONIC PAIN OF RIGHT KNEE: ICD-10-CM

## 2024-07-08 DIAGNOSIS — M25.561 CHRONIC PAIN OF RIGHT KNEE: ICD-10-CM

## 2024-07-08 RX ORDER — BISACODYL 5 MG
5 TABLET, DELAYED RELEASE (ENTERIC COATED) ORAL DAILY PRN
COMMUNITY

## 2024-07-08 RX ORDER — NAPROXEN SODIUM 220 MG/1
81 TABLET, FILM COATED ORAL DAILY
COMMUNITY

## 2024-07-08 RX ORDER — FLUTICASONE PROPIONATE 50 UG/1
2 SPRAY, METERED NASAL 2 TIMES DAILY
COMMUNITY
Start: 2024-06-10

## 2024-07-08 NOTE — LETTER
July 8, 2024    Sary Perez  [address] [address]          [facility]  [address], [address]  [place] [address]  Phone: [phone]  Fax: [phone] July 8, 2024     Patient: Sary Perez   YOB: 1985   Date of Visit: 7/8/2024       To Whom It May Concern:    It is my medical opinion that Sary Perez should remain out of work due to her knee injury.    If you have any questions or concerns, please don't hesitate to call.    Sincerely,        Roe Rao Jr., MD

## 2024-07-08 NOTE — PROGRESS NOTES
"Chief Complaint:   Chief Complaint   Patient presents with    Work Related Injury     Patient here to address and establish care with right knee only today with work comp . She is also being followed by Dr. Rao for the left knee however this visit/auth only covers the right knee today. She was previously seen for right knee with Dr. Gaspar and Dr. Sin. Previously had cartilage graft sx in 2020 and multiple Synvisc injections- last injection over 6 months ago. She is still having sharp, locking up, and " catching."       Consulting Physician: No ref. provider found    History of present illness:    she is a pleasant 38 y.o. year old female who has had continued pain over the right knee.  She originally injured the right knee back in 2019.  She was twisting the knee while reaching down for her firearm and felt a pop and swelling around the right knee.  She would difficulty in ambulation of the time.  She was previously seen by Dr. Gaspar and underwent a attempted meniscal repair and then a partial meniscectomy.  She also underwent a cartilage procedure and has had a couple of Synvisc injections the last of which was 15 months ago or so.  She has had some persistent pain around the right knee both medial and lateral.  She notices some sharp pain around the kneecap.  She occasionally has not lock up or catch on her.    Past Medical History:   Diagnosis Date    ADD (attention deficit disorder)     Anxiety and depression     Tailbone injury     coccyx    Torn meniscus        Past Surgical History:   Procedure Laterality Date    ARTHROTOMY OF KNEE Right     BLADDER SUSPENSION      perineum repair    HYSTERECTOMY  2012    partial    KNEE ARTHROSCOPY Right     KNEE ARTHROSCOPY W/ OATS PROCEDURE Left 6/20/2024    Procedure: REPAIR, KNEE, ARTHROSCOPIC, WITH OSTEOCHONDRAL GRAFT TRANSFER - allograft;  Surgeon: Roe Rao Jr., MD;  Location: [facility];  Service: Orthopedics;  Laterality: Left;    KNEE SURGERY Left  " "   x 3 (1-mensicus repair 2- cartilage filler 3- mensicus removal       Current Outpatient Medications   Medication Sig    aspirin 81 MG Chew Take 81 mg by mouth once daily.    bisacodyL (DULCOLAX) 5 mg EC tablet Take 5 mg by mouth daily as needed for Constipation.    buPROPion (WELLBUTRIN XL) 300 MG 24 hr tablet Take 300 mg by mouth every morning.    cetirizine (ZYRTEC) 10 mg Cap Take 10 mg by mouth Daily.    FLONASE ALLERGY RELIEF 50 mcg/actuation nasal spray 2 sprays by Each Nostril route 2 (two) times daily.    ketorolac (TORADOL) 10 mg tablet Take 1 tablet (10 mg total) by mouth 3 (three) times daily.    methocarbamoL (ROBAXIN) 750 MG Tab Take 1 tablet (750 mg total) by mouth 3 (three) times daily.    naltrexone (DEPADE) 50 mg tablet Take 50 mg by mouth once daily.    oxyCODONE-acetaminophen (PERCOCET) 5-325 mg per tablet Take 1 tablet by mouth every 6 (six) hours as needed for Pain.    traZODone (DESYREL) 100 MG tablet Take 100 mg by mouth nightly as needed.    VYVANSE 60 mg capsule Take 60 mg by mouth every morning.     No current facility-administered medications for this visit.       Review of patient's allergies indicates:   Allergen Reactions    Cat/feline products Hives, Itching, Other (See Comments), Rash and Swelling    Penicillins Shortness Of Breath     From childhood "stop breathing"    Hay fever and allergy relief Anxiety, Itching, Other (See Comments), Photosensitivity and Rash       Family History   Problem Relation Name Age of Onset    Alcohol abuse Mother Chasity     Depression Mother Chasity     Drug abuse Mother Chasity     Early death Mother Chasity     Miscarriages / Stillbirths Mother Chasity     Alcohol abuse Father Huey     Drug abuse Father Huey     Alcohol abuse Maternal Grandfather Wesly     Arthritis Maternal Grandmother Carissa     Asthma Maternal Grandmother Carissa     Depression Maternal Grandmother Carissa     Diabetes Maternal Grandmother Carissa     Alcohol abuse Maternal Aunt Noemi     Drug abuse " Maternal Aunt Noemi     Alcohol abuse Maternal Uncle Huey     Drug abuse Maternal Uncle Huey        Social History     Socioeconomic History    Marital status: Unknown   Tobacco Use    Smoking status: Never    Smokeless tobacco: Never   Substance and Sexual Activity    Alcohol use: Never    Drug use: Never    Sexual activity: Yes     Partners: Male     Birth control/protection: Other-see comments     Comment: Hystrorectomy       Review of Systems:    Constitution:   Denies chills, fever, and sweats.  HENT:   Denies headaches or blurry vision.  Cardiovascular:  Denies chest pain or irregular heart beat.  Respiratory:   Denies cough or shortness of breath.  Gastrointestinal:  Denies abdominal pain, nausea, or vomiting.  Musculoskeletal:   Denies muscle cramps.  Neurological:   Denies dizziness or focal weakness.  Psychiatric/Behavior: Normal mental status.  Hematology/Lymph:  Denies bleeding problem or easy bruising/bleeding.  Skin:    Denies rash or suspicious lesions.    Examination:    Vital Signs:    Vitals:    07/08/24 1022   BP: 121/83   Pulse: 96   Weight: 93.2 kg (205 lb 7.5 oz)   Height: 6' (1.829 m)       Body mass index is 27.87 kg/m².    Constitution:   Well-developed, well nourished patient in no acute distress.  Neurological:   Alert and oriented x 3 and cooperative to examination.     Psychiatric/Behavior: Normal mental status.  Respiratory:   No shortness of breath.  Cards:   Pulses palpable, brisk cap refill  Eyes:    Extraoccular muscles intact  Skin:    No scars, rash or suspicious lesions.    MSK:   Standing exam  stance: normal alignment, no significant leg-length discrepancy  gait: antalgic    Knee examination  - General comments:  Healed arthroscopic and medial parapatellar incision.    - Tenderness:  Parapatellar    Knee                  RIGHT    LEFT  Skin:                  Intact      Intact  ROM:                 0-130      0-130  Effusion:             +             Neg  MJL TTP:            Neg         Neg  LJL TTP:            Neg         Neg  Ramon:         Neg         Neg  Pat crep:             +            Neg  Patella TTPs:       +           Neg  Patella grind:        +          Neg  Lachman:           Neg        Neg  Pivot shift:          Neg        Neg  Valgus stress:    Neg        Neg  Varus stress:      Neg        Neg  Posterior drawer: Neg       Neg    N-V intact intact  Hip: nml nml    Lower extremity edema:Negative     Imaging: X-rays ordered and images interpreted today personally by me of four views of the right knee show normal bony alignment.       Assessment: Chondral defect of condyle of right femur  -     X-Ray Knee Complete 4 Or More Views Right; Future; Expected date: 07/08/2024        Plan:  We will get MRI to evaluate her right knee.  I will see him back afterward for review

## 2024-07-12 ENCOUNTER — OFFICE VISIT (OUTPATIENT)
Dept: ORTHOPEDICS | Facility: CLINIC | Age: 39
End: 2024-07-12
Payer: OTHER MISCELLANEOUS

## 2024-07-12 VITALS
HEIGHT: 72 IN | SYSTOLIC BLOOD PRESSURE: 122 MMHG | HEART RATE: 106 BPM | WEIGHT: 202 LBS | BODY MASS INDEX: 27.36 KG/M2 | DIASTOLIC BLOOD PRESSURE: 85 MMHG

## 2024-07-12 DIAGNOSIS — M23.8X2 CHONDRAL DEFECT OF CONDYLE OF LEFT FEMUR: Primary | ICD-10-CM

## 2024-07-12 NOTE — PROGRESS NOTES
Chief Complaint:   Chief Complaint   Patient presents with    Left Knee - Follow-up     4 weeks sp Lt knee scope 6/20/24 gl 9/18/24, WC claim number ends in 40, still hurting but getting better, still wakes her up at night, wearing a stabilzing brace with crutches, PT 2 times a week, has extra fishing wire, when swelling toes go numb, 7/3/24 fell at PT wet floor and crutch slipped from under her        History of present illness:  06/20/2024: Left knee arthroscopic trochlea OATS    She returns today.  Her pain is improving.  She is working in therapy.  She has been using the brace and crutches.    Musculoskeletal:   Incisions healed.  Distally she is neurovascularly intact    Imaging:        Assessment: 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        Plan:  Doing well status post above.  Continue rehab.  I will see her back in 4 weeks with radiographs of the left knee

## 2024-07-12 NOTE — LETTER
July 12, 2024       [facility]  [address], [address]  [place] [address]  Phone: [phone]  Fax: [phone]       Patient: Sary Perez   YOB: 1985  Date of Visit: 07/12/2024    To Whom It May Concern:    Vaibhav Perez  was at [facility] on 07/12/2024. The patient is unable to return to work at this time. Follow up appointment 8/12/24. If you have any questions or concerns, or if I can be of further assistance, please do not hesitate to contact me.    Sincerely,    Roe Rao M.D.

## 2024-07-19 DIAGNOSIS — M23.8X1 CHONDRAL DEFECT OF CONDYLE OF RIGHT FEMUR: Primary | ICD-10-CM

## 2024-08-12 ENCOUNTER — HOSPITAL ENCOUNTER (OUTPATIENT)
Dept: RADIOLOGY | Facility: CLINIC | Age: 39
Discharge: HOME | End: 2024-08-12
Attending: ORTHOPAEDIC SURGERY
Payer: OTHER MISCELLANEOUS

## 2024-08-12 ENCOUNTER — OFFICE VISIT (OUTPATIENT)
Dept: ORTHOPEDICS | Facility: CLINIC | Age: 39
End: 2024-08-12
Payer: OTHER MISCELLANEOUS

## 2024-08-12 VITALS
DIASTOLIC BLOOD PRESSURE: 84 MMHG | HEIGHT: 72 IN | WEIGHT: 201.94 LBS | BODY MASS INDEX: 27.35 KG/M2 | HEART RATE: 78 BPM | SYSTOLIC BLOOD PRESSURE: 128 MMHG

## 2024-08-12 DIAGNOSIS — M25.562 LEFT KNEE PAIN, UNSPECIFIED CHRONICITY: ICD-10-CM

## 2024-08-12 DIAGNOSIS — M23.8X2 CHONDRAL DEFECT OF CONDYLE OF LEFT FEMUR: Primary | ICD-10-CM

## 2024-08-12 PROCEDURE — 99024 POSTOP FOLLOW-UP VISIT: CPT | Mod: ,,, | Performed by: ORTHOPAEDIC SURGERY

## 2024-08-12 PROCEDURE — 73560 X-RAY EXAM OF KNEE 1 OR 2: CPT | Mod: LT,,, | Performed by: ORTHOPAEDIC SURGERY

## 2024-08-12 NOTE — LETTER
August 12, 2024       [facility]  [address], [address]  [place] [address]  Phone: [phone]  Fax: [phone]       Patient: Sary Perez   YOB: 1985  Date of Visit: 08/12/2024    To Whom It May Concern:    Vaibhav Perez  was at [facility] on 08/12/2024. The patient may not return to work. Follow up appointment is 9/23/24. If you have any questions or concerns, or if I can be of further assistance, please do not hesitate to contact me.    Sincerely,    Dr Roe Rao MD

## 2024-08-12 NOTE — PROGRESS NOTES
Chief Complaint:   Chief Complaint   Patient presents with    Left Knee - Follow-up     7 wks lt knee scope sx 6/20/24- Reports swelling and bruising in knee. Is ambulating with crutch and brace. Stated stiffness occurs when in one position for too long. Has a question about antiinflammatory with another doctor.        History of present illness:  06/20/2024: Left knee arthroscopic trochlea OATS    She returns today.  Her pain is improving.  She is working in therapy.  She has been using the brace and one crutch.    Musculoskeletal:   Incisions healed.  Range of motion nearly full.  Distally she is neurovascularly intact    Imaging:  Two views left knee show appropriate implant position.       Assessment: 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    Left knee pain, unspecified chronicity  -     Cancel: X-Ray Knee Complete 4 or More Views Left; Future; Expected date: 08/12/2024        Plan:  Doing well status post above.  Continue rehab.  I will see her back in 6 weeks for range-of-motion check.

## 2024-08-19 ENCOUNTER — OFFICE VISIT (OUTPATIENT)
Dept: ORTHOPEDICS | Facility: CLINIC | Age: 39
End: 2024-08-19
Payer: OTHER MISCELLANEOUS

## 2024-08-19 VITALS
WEIGHT: 201.94 LBS | SYSTOLIC BLOOD PRESSURE: 142 MMHG | DIASTOLIC BLOOD PRESSURE: 90 MMHG | HEIGHT: 72 IN | HEART RATE: 103 BPM | BODY MASS INDEX: 27.35 KG/M2

## 2024-08-19 DIAGNOSIS — S83.281A TEAR OF LATERAL MENISCUS OF RIGHT KNEE, CURRENT, UNSPECIFIED TEAR TYPE, INITIAL ENCOUNTER: ICD-10-CM

## 2024-08-19 DIAGNOSIS — S83.241A ACUTE MEDIAL MENISCUS TEAR OF RIGHT KNEE, INITIAL ENCOUNTER: ICD-10-CM

## 2024-08-19 DIAGNOSIS — M23.8X1 CHONDRAL DEFECT OF CONDYLE OF RIGHT FEMUR: Primary | ICD-10-CM

## 2024-08-19 PROCEDURE — 99213 OFFICE O/P EST LOW 20 MIN: CPT | Mod: 24,,, | Performed by: ORTHOPAEDIC SURGERY

## 2024-08-19 NOTE — PROGRESS NOTES
Chief Complaint:   Chief Complaint   Patient presents with    Knee Pain     WORK COMP. Right knee pain. XR and MRI (report) in chart. Did not bring disc in for images. States pain ongoing and not getting better. In therapy for left knee right now. States catching and grinding as well. Ambulating with crutches.       Consulting Physician: No ref. provider found    History of present illness:    she is a pleasant 38 y.o. year old female who has had continued pain over the right knee.  She originally injured the right knee back in 2019.  She was twisting the knee while reaching down for her firearm and felt a pop and swelling around the right knee.  She would difficulty in ambulation of the time.  She was previously seen by Dr. Gaspar and underwent a attempted meniscal repair and then a partial meniscectomy.  She also underwent a biocartilage procedure by Dr. Sin in April 2020. She has had a couple of Synvisc injections the last of which was 2023.  She has had some persistent pain around the right knee both medial and lateral.  She notices some sharp pain around the kneecap.  She occasionally has lock up or catch on her.    She returns today.  She is status post MRI.  She has had continued pain around the right knee.    Past Medical History:   Diagnosis Date    ADD (attention deficit disorder)     Anxiety and depression     Tailbone injury     coccyx    Torn meniscus        Past Surgical History:   Procedure Laterality Date    ARTHROTOMY OF KNEE Right     BLADDER SUSPENSION      perineum repair    HYSTERECTOMY  2012    partial    KNEE ARTHROSCOPY Right     KNEE ARTHROSCOPY W/ OATS PROCEDURE Left 6/20/2024    Procedure: REPAIR, KNEE, ARTHROSCOPIC, WITH OSTEOCHONDRAL GRAFT TRANSFER - allograft;  Surgeon: Roe Rao Jr., MD;  Location: [facility];  Service: Orthopedics;  Laterality: Left;    KNEE SURGERY Left     x 3 (1-mensicus repair 2- cartilage filler 3- mensicus removal       Current Outpatient Medications  "  Medication Sig    aspirin 81 MG Chew Take 81 mg by mouth once daily.    bisacodyL (DULCOLAX) 5 mg EC tablet Take 5 mg by mouth daily as needed for Constipation.    buPROPion (WELLBUTRIN XL) 300 MG 24 hr tablet Take 300 mg by mouth every morning.    cetirizine (ZYRTEC) 10 mg Cap Take 10 mg by mouth Daily.    FLONASE ALLERGY RELIEF 50 mcg/actuation nasal spray 2 sprays by Each Nostril route 2 (two) times daily.    methocarbamoL (ROBAXIN) 750 MG Tab Take 1 tablet (750 mg total) by mouth 3 (three) times daily.    naltrexone (DEPADE) 50 mg tablet Take 50 mg by mouth once daily.    oxyCODONE-acetaminophen (PERCOCET) 5-325 mg per tablet Take 1 tablet by mouth every 6 (six) hours as needed for Pain.    traZODone (DESYREL) 100 MG tablet Take 100 mg by mouth nightly as needed.    VYVANSE 60 mg capsule Take 60 mg by mouth every morning.    ketorolac (TORADOL) 10 mg tablet Take 1 tablet (10 mg total) by mouth 3 (three) times daily. (Patient not taking: Reported on 8/19/2024)     No current facility-administered medications for this visit.       Review of patient's allergies indicates:   Allergen Reactions    Cat/feline products Hives, Itching, Other (See Comments), Rash and Swelling    Penicillins Shortness Of Breath     From childhood "stop breathing"    Hay fever and allergy relief Anxiety, Itching, Other (See Comments), Photosensitivity and Rash       Family History   Problem Relation Name Age of Onset    Alcohol abuse Mother Chasity     Depression Mother Chasity     Drug abuse Mother Chasity     Early death Mother Chasity     Miscarriages / Stillbirths Mother Chasity     Alcohol abuse Father Huey     Drug abuse Father Huey     Alcohol abuse Maternal Grandfather Wesly     Arthritis Maternal Grandmother Carissa     Asthma Maternal Grandmother Carissa     Depression Maternal Grandmother Carissa     Diabetes Maternal Grandmother Carissa     Alcohol abuse Maternal Aunt Noemi     Drug abuse Maternal Aunt Noemi     Alcohol abuse Maternal Uncle Huey     " Drug abuse Maternal Uncle Huey        Social History     Socioeconomic History    Marital status: Unknown   Tobacco Use    Smoking status: Never    Smokeless tobacco: Never   Substance and Sexual Activity    Alcohol use: Never    Drug use: Never    Sexual activity: Yes     Partners: Male     Birth control/protection: Other-see comments     Comment: Hystrorectomy       Review of Systems:    Constitution:   Denies chills, fever, and sweats.  HENT:   Denies headaches or blurry vision.  Cardiovascular:  Denies chest pain or irregular heart beat.  Respiratory:   Denies cough or shortness of breath.  Gastrointestinal:  Denies abdominal pain, nausea, or vomiting.  Musculoskeletal:   Denies muscle cramps.  Neurological:   Denies dizziness or focal weakness.  Psychiatric/Behavior: Normal mental status.  Hematology/Lymph:  Denies bleeding problem or easy bruising/bleeding.  Skin:    Denies rash or suspicious lesions.    Examination:    Vital Signs:    Vitals:    08/19/24 1517   BP: (!) 142/90   Pulse: 103   Weight: 91.6 kg (201 lb 15.1 oz)   Height: 6' (1.829 m)       Body mass index is 27.39 kg/m².    Constitution:   Well-developed, well nourished patient in no acute distress.  Neurological:   Alert and oriented x 3 and cooperative to examination.     Psychiatric/Behavior: Normal mental status.  Respiratory:   No shortness of breath.  Cards:   Pulses palpable, brisk cap refill  Eyes:    Extraoccular muscles intact  Skin:    No scars, rash or suspicious lesions.    MSK:   Standing exam  stance: normal alignment, no significant leg-length discrepancy  gait: antalgic    Knee examination  - General comments:  Healed arthroscopic and medial parapatellar incision.    - Tenderness:  Parapatellar    Knee                  RIGHT    LEFT  Skin:                  Intact      Intact  ROM:                 0-130      0-130  Effusion:             +             Neg  MJL TTP:           Neg         Neg  LJL TTP:            Neg          Neg  Ramon:         Neg         Neg  Pat crep:             +            Neg  Patella TTPs:       +           Neg  Patella grind:        +          Neg  Lachman:           Neg        Neg  Pivot shift:          Neg        Neg  Valgus stress:    Neg        Neg  Varus stress:      Neg        Neg  Posterior drawer: Neg       Neg    N-V intact intact  Hip: nml nml    Lower extremity edema:Negative     Imaging: X-rays ordered and images interpreted today personally by me of four views of the right knee show normal bony alignment.  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  She maybe a candidate for repeat knee arthroscopy with a cartilage transplant pending review of the MRI images

## 2024-08-19 NOTE — LETTER
August 19, 2024       [facility]  [address], [address]  [place] [address]  Phone: [phone]  Fax: [phone]       Patient: Sary Perez   YOB: 1985  Date of Visit: 08/19/2024    To Whom It May Concern:    Vaibhav Perez  was at [facility] on 08/19/2024. The patient is unable to return to work at this time. Please excuse absence. If you have any questions or concerns, or if I can be of further assistance, please do not hesitate to contact me.    Sincerely,    Roe Rao M.D.

## 2024-09-09 ENCOUNTER — TELEPHONE (OUTPATIENT)
Dept: ORTHOPEDICS | Facility: CLINIC | Age: 39
End: 2024-09-09
Payer: OTHER MISCELLANEOUS

## 2024-09-23 ENCOUNTER — OFFICE VISIT (OUTPATIENT)
Dept: ORTHOPEDICS | Facility: CLINIC | Age: 39
End: 2024-09-23
Payer: OTHER MISCELLANEOUS

## 2024-09-23 VITALS
SYSTOLIC BLOOD PRESSURE: 118 MMHG | HEART RATE: 85 BPM | WEIGHT: 205 LBS | BODY MASS INDEX: 27.77 KG/M2 | HEIGHT: 72 IN | DIASTOLIC BLOOD PRESSURE: 84 MMHG

## 2024-09-23 DIAGNOSIS — M23.8X2 CHONDRAL DEFECT OF CONDYLE OF LEFT FEMUR: Primary | ICD-10-CM

## 2024-09-23 PROCEDURE — 99213 OFFICE O/P EST LOW 20 MIN: CPT | Mod: ,,, | Performed by: ORTHOPAEDIC SURGERY

## 2024-09-23 NOTE — PROGRESS NOTES
Chief Complaint:   Chief Complaint   Patient presents with    Left Knee - Follow-up     **WC** 3 month sp Lt knee scope 6/20/24 gl 9/18/24, reports doing good, only place its bothering her is below the knee cap at night other than that no problems, been off crutch and brace for a week       Consulting Physician: No ref. provider found    History of present illness:  06/20/2024: Left knee arthroscopic trochlea OATS    She returns today.  Her knees doing pretty well.  She is finishing up her therapy.  She does not notice some pain around the kneecap at night.  She has been off the crutches and out of the brace.  She has had continued right hip pain and has plans to undergo a right hip arthroscopic labral repair in early 2025    Past Medical History:   Diagnosis Date    ADD (attention deficit disorder)     Anxiety and depression     Tailbone injury     coccyx    Torn meniscus        Past Surgical History:   Procedure Laterality Date    ARTHROTOMY OF KNEE Right     BLADDER SUSPENSION      perineum repair    HYSTERECTOMY  2012    partial    KNEE ARTHROSCOPY Right     KNEE ARTHROSCOPY W/ OATS PROCEDURE Left 6/20/2024    Procedure: REPAIR, KNEE, ARTHROSCOPIC, WITH OSTEOCHONDRAL GRAFT TRANSFER - allograft;  Surgeon: Roe Rao Jr., MD;  Location: [facility];  Service: Orthopedics;  Laterality: Left;    KNEE SURGERY Left     x 3 (1-mensicus repair 2- cartilage filler 3- mensicus removal       Current Outpatient Medications   Medication Sig    buPROPion (WELLBUTRIN XL) 300 MG 24 hr tablet Take 300 mg by mouth every morning.    cetirizine (ZYRTEC) 10 mg Cap Take 10 mg by mouth Daily.    naltrexone (DEPADE) 50 mg tablet Take 50 mg by mouth once daily.    traZODone (DESYREL) 100 MG tablet Take 100 mg by mouth nightly as needed.    VYVANSE 60 mg capsule Take 60 mg by mouth every morning.    aspirin 81 MG Chew Take 81 mg by mouth once daily.    bisacodyL (DULCOLAX) 5 mg EC tablet Take 5 mg by mouth daily as needed for  "Constipation.    FLONASE ALLERGY RELIEF 50 mcg/actuation nasal spray 2 sprays by Each Nostril route 2 (two) times daily.    ketorolac (TORADOL) 10 mg tablet Take 1 tablet (10 mg total) by mouth 3 (three) times daily. (Patient not taking: Reported on 8/19/2024)    methocarbamoL (ROBAXIN) 750 MG Tab Take 1 tablet (750 mg total) by mouth 3 (three) times daily.    oxyCODONE-acetaminophen (PERCOCET) 5-325 mg per tablet Take 1 tablet by mouth every 6 (six) hours as needed for Pain. (Patient not taking: Reported on 9/23/2024)     No current facility-administered medications for this visit.       Review of patient's allergies indicates:   Allergen Reactions    Cat/feline products Hives, Itching, Other (See Comments), Rash and Swelling    Penicillins Shortness Of Breath     From childhood "stop breathing"    Hay fever and allergy relief Anxiety, Itching, Other (See Comments), Photosensitivity and Rash       Family History   Problem Relation Name Age of Onset    Alcohol abuse Mother Chasity     Depression Mother Chasity     Drug abuse Mother Chasity     Early death Mother Chasity     Miscarriages / Stillbirths Mother Chasity     Alcohol abuse Father Huey     Drug abuse Father Huey     Alcohol abuse Maternal Grandfather Wesly     Arthritis Maternal Grandmother Carissa     Asthma Maternal Grandmother Carissa     Depression Maternal Grandmother Carissa     Diabetes Maternal Grandmother Carissa     Alcohol abuse Maternal Aunt Noemi     Drug abuse Maternal Aunt Noemi     Alcohol abuse Maternal Uncle Huey     Drug abuse Maternal Uncle Huey        Social History     Socioeconomic History    Marital status: Unknown   Tobacco Use    Smoking status: Never    Smokeless tobacco: Never   Substance and Sexual Activity    Alcohol use: Never    Drug use: Never    Sexual activity: Yes     Partners: Male     Birth control/protection: Other-see comments     Comment: Hystrorectomy       Review of Systems:    Constitution:   Denies chills, fever, and " sweats.  HENT:   Denies headaches or blurry vision.  Cardiovascular:  Denies chest pain or irregular heart beat.  Respiratory:   Denies cough or shortness of breath.  Gastrointestinal:  Denies abdominal pain, nausea, or vomiting.  Musculoskeletal:   Denies muscle cramps.  Neurological:   Denies dizziness or focal weakness.  Psychiatric/Behavior: Normal mental status.  Hematology/Lymph:  Denies bleeding problem or easy bruising/bleeding.  Skin:    Denies rash or suspicious lesions.    Examination:    Vital Signs:    Vitals:    09/23/24 1102   BP: 118/84   Pulse: 85   Weight: 93 kg (205 lb)   Height: 6' (1.829 m)       Body mass index is 27.8 kg/m².    Constitution:   Well-developed, well nourished patient in no acute distress.  Neurological:   Alert and oriented x 3 and cooperative to examination.     Psychiatric/Behavior: Normal mental status.  Respiratory:   No shortness of breath.  Cards:   Pulses palpable, brisk cap refill  Eyes:    Extraoccular muscles intact  Skin:    No scars, rash or suspicious lesions.    MSK:   Standing exam  stance: normal alignment, no significant leg-length discrepancy  gait:  Mild antalgic    Knee examination  - General comments: unremarkable appearance    - Tenderness:  Parapatellar    Knee                  RIGHT    LEFT  Skin:                  Intact      Intact  ROM:                 0-130      0-130  Effusion:             Neg        Neg  MJL TTP:           Neg         Neg  LJL TTP:            Neg         Neg  Ramon:         Neg         Neg  Pat crep:            Neg         Neg  Patella TTPs:     Neg         Neg  Patella grind:      Neg        Neg  Lachman:           Neg        Neg  Pivot shift:          Neg        Neg  Valgus stress:    Neg        Neg  Varus stress:      Neg        Neg  Posterior drawer: Neg       Neg    N-V intact intact  Hip: nml nml    Lower extremity edema:Negative      Assessment: 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        Plan:  Doing well status post  above.  Continue rehab.  She is going to continue strengthening of her quad muscle.  I will see her back in 3 months for final visit.

## 2024-09-23 NOTE — LETTER
September 23, 2024       [facility]  [address], [address]  [place] [address]  Phone: [phone]  Fax: [phone]       Patient: Sary Perez   YOB: 1985  Date of Visit: 09/23/2024    To Whom It May Concern:    Vaibhav Perez  was at [facility] on 09/23/2024. The patient is unable to return to work at time. Follow up appointment 12/23/24. If you have any questions or concerns, or if I can be of further assistance, please do not hesitate to contact me.    Sincerely,    Roe Rao M.D.

## 2025-01-08 ENCOUNTER — OFFICE VISIT (OUTPATIENT)
Dept: ORTHOPEDICS | Facility: CLINIC | Age: 40
End: 2025-01-08
Payer: OTHER MISCELLANEOUS

## 2025-01-08 VITALS
WEIGHT: 205 LBS | HEART RATE: 83 BPM | SYSTOLIC BLOOD PRESSURE: 135 MMHG | DIASTOLIC BLOOD PRESSURE: 86 MMHG | BODY MASS INDEX: 27.77 KG/M2 | HEIGHT: 72 IN

## 2025-01-08 DIAGNOSIS — M23.8X2 CHONDRAL DEFECT OF CONDYLE OF LEFT FEMUR: Primary | ICD-10-CM

## 2025-01-08 PROCEDURE — 99213 OFFICE O/P EST LOW 20 MIN: CPT | Mod: ,,, | Performed by: ORTHOPAEDIC SURGERY

## 2025-01-08 NOTE — PROGRESS NOTES
"Chief Complaint:   Chief Complaint   Patient presents with    Left Knee - Follow-up     **WC**  7 months sp Left knee scope 6/20/24 gl 9/18/24, "pretty much the same- its fine, catches some, better than what it was, bruising", not taking anything for pain, stopped the taking ibuprofen in December- "was eating them like skittles" for her hip, not taking aspirin 81 mg       Consulting Physician: No ref. provider found    History of present illness:  06/20/2024: Left knee arthroscopic trochlea OATS    She returns today.  Her knees doing pretty well.  She is finishing up her therapy.  She is much improved then she was before surgery.  She does have some continued intermittent bruising around the knee.  She has stopped her ibuprofen in December in his really starting to feel her right hip pain.  In regards to her left knee she will still occasionally have some popping in the knee and will rarely have catching in the knee but is improved from preop.      She has had continued right hip pain and has plans to undergo a right hip arthroscopic labral repair in early 2025    Past Medical History:   Diagnosis Date    ADD (attention deficit disorder)     Anxiety and depression     Tailbone injury     coccyx    Torn meniscus        Past Surgical History:   Procedure Laterality Date    ARTHROTOMY OF KNEE Right     BLADDER SUSPENSION      perineum repair    HYSTERECTOMY  2012    partial    KNEE ARTHROSCOPY Right     KNEE ARTHROSCOPY W/ OATS PROCEDURE Left 6/20/2024    Procedure: REPAIR, KNEE, ARTHROSCOPIC, WITH OSTEOCHONDRAL GRAFT TRANSFER - allograft;  Surgeon: Roe Rao Jr., MD;  Location: [facility];  Service: Orthopedics;  Laterality: Left;    KNEE SURGERY Left     x 3 (1-mensicus repair 2- cartilage filler 3- mensicus removal       Current Outpatient Medications   Medication Sig    buPROPion (WELLBUTRIN XL) 300 MG 24 hr tablet Take 300 mg by mouth every morning.    cetirizine (ZYRTEC) 10 mg Cap Take 10 mg by mouth Daily.    " "traZODone (DESYREL) 100 MG tablet Take 100 mg by mouth nightly as needed.    VYVANSE 60 mg capsule Take 60 mg by mouth every morning.    aspirin 81 MG Chew Take 81 mg by mouth once daily.    bisacodyL (DULCOLAX) 5 mg EC tablet Take 5 mg by mouth daily as needed for Constipation.    FLONASE ALLERGY RELIEF 50 mcg/actuation nasal spray 2 sprays by Each Nostril route 2 (two) times daily.    ketorolac (TORADOL) 10 mg tablet Take 1 tablet (10 mg total) by mouth 3 (three) times daily.    methocarbamoL (ROBAXIN) 750 MG Tab Take 1 tablet (750 mg total) by mouth 3 (three) times daily.    naltrexone (DEPADE) 50 mg tablet Take 50 mg by mouth once daily. (Patient not taking: Reported on 1/8/2025)    oxyCODONE-acetaminophen (PERCOCET) 5-325 mg per tablet Take 1 tablet by mouth every 6 (six) hours as needed for Pain.     No current facility-administered medications for this visit.       Review of patient's allergies indicates:   Allergen Reactions    Cat/feline products Hives, Itching, Other (See Comments), Rash and Swelling    Penicillins Shortness Of Breath     From childhood "stop breathing"    Hay fever and allergy relief Anxiety, Itching, Other (See Comments), Photosensitivity and Rash       Family History   Problem Relation Name Age of Onset    Alcohol abuse Mother Chasity     Depression Mother Chasity     Drug abuse Mother Chasity     Early death Mother Chasity     Miscarriages / Stillbirths Mother Chasity     Alcohol abuse Father Huey     Drug abuse Father Huey     Alcohol abuse Maternal Grandfather Wesly     Arthritis Maternal Grandmother Carissa     Asthma Maternal Grandmother Carissa     Depression Maternal Grandmother Carissa     Diabetes Maternal Grandmother Carissa     Alcohol abuse Maternal Aunt Noemi     Drug abuse Maternal Aunt Noemi     Alcohol abuse Maternal Uncle Huey     Drug abuse Maternal Uncle Huey        Social History     Socioeconomic History    Marital status: Unknown   Tobacco Use    Smoking status: Never    Smokeless " tobacco: Never   Substance and Sexual Activity    Alcohol use: Never    Drug use: Never    Sexual activity: Yes     Partners: Male     Birth control/protection: Other-see comments     Comment: Hystrorectomy       Review of Systems:    Constitution:   Denies chills, fever, and sweats.  HENT:   Denies headaches or blurry vision.  Cardiovascular:  Denies chest pain or irregular heart beat.  Respiratory:   Denies cough or shortness of breath.  Gastrointestinal:  Denies abdominal pain, nausea, or vomiting.  Musculoskeletal:   Denies muscle cramps.  Neurological:   Denies dizziness or focal weakness.  Psychiatric/Behavior: Normal mental status.  Hematology/Lymph:  Denies bleeding problem or easy bruising/bleeding.  Skin:    Denies rash or suspicious lesions.    Examination:    Vital Signs:    Vitals:    01/08/25 1611   BP: 135/86   Pulse: 83   Weight: 93 kg (205 lb 0.4 oz)   Height: 6' (1.829 m)   PainSc: 0-No pain   PainLoc: Knee       Body mass index is 27.81 kg/m².    Constitution:   Well-developed, well nourished patient in no acute distress.  Neurological:   Alert and oriented x 3 and cooperative to examination.     Psychiatric/Behavior: Normal mental status.  Respiratory:   No shortness of breath.  Cards:   Pulses palpable, brisk cap refill  Eyes:    Extraoccular muscles intact  Skin:    No scars, rash or suspicious lesions.    MSK:   Standing exam  stance: normal alignment, no significant leg-length discrepancy  gait:  Mild antalgic    Knee examination  - General comments: unremarkable appearance    - Tenderness:  Medial with bruising    Knee                  RIGHT    LEFT  Skin:                  Intact      Intact  ROM:                 0-130      0-130  Effusion:             Neg        Neg  MJL TTP:           Neg         Neg  LJL TTP:            Neg         Neg  Ramon:         Neg         Neg  Pat crep:            Neg         Neg  Patella TTPs:     Neg         Neg  Patella grind:      Neg        Neg  Lachman:            Neg        Neg  Pivot shift:          Neg        Neg  Valgus stress:    Neg        Neg  Varus stress:      Neg        Neg  Posterior drawer: Neg       Neg    N-V intact intact  Hip: nml nml    Lower extremity edema:Negative      Assessment: 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        Plan:  Doing well status post above.  She will continue her home exercise program.  In regards to her left knee, she is at [employer] and I do not have restrictions for her work wise in regards to the left knee.  In regards to the left knee, I will see her back as needed.

## 2025-01-08 NOTE — LETTER
January 8, 2025       [facility]  [address], [address]  [place] [place] [address]  Phone: [phone]  Fax: [phone]       Patient: Sary Perez   YOB: 1985  Date of Visit: 01/08/2025    To Whom It May Concern:    Vaibhav Perez  was at [facility] on 01/08/2025. The patient is released to return to work for her left knee without any restrictions. If you have any questions or concerns, or if I can be of further assistance, please do not hesitate to contact me.    Sincerely,    Dr Roe Rao MD

## (undated) DEVICE — SOL NACL IRR 3000ML

## (undated) DEVICE — GLOVE SIGNATURE MICRO LTX 8

## (undated) DEVICE — PENCIL ELECSURG ROCKER 15FT

## (undated) DEVICE — SUT MONOCRYL 3-0 PS-2 UND

## (undated) DEVICE — BLADE SURG CARBON STEEL SZ11

## (undated) DEVICE — CUFF TOURNIQUET STER DIS 34

## (undated) DEVICE — BLADE SHAVER LANZA 4.2X13CM

## (undated) DEVICE — SUT VICRYL BR 1 GEN 27 CT-1

## (undated) DEVICE — KIT SURGICAL TURNOVER

## (undated) DEVICE — BANDAGE ACE DOUBLE STER 6IN

## (undated) DEVICE — SUT MONOCRYL 2-0 S UND

## (undated) DEVICE — Device

## (undated) DEVICE — DRAPE FULL SHEET 70X100IN

## (undated) DEVICE — ELECTRODE PATIENT RETURN DISP

## (undated) DEVICE — APPLICATOR CHLORAPREP ORN 26ML

## (undated) DEVICE — POSITIONER HEAD ADULT

## (undated) DEVICE — GOWN POLY REINF X-LONG 2XL

## (undated) DEVICE — PADDING CAST SOFT-ROLL 6 X 4

## (undated) DEVICE — NDL MAGELLAN SAFETY 18G 1.5IN

## (undated) DEVICE — GLOVE SENSICARE PI GRN 6.5

## (undated) DEVICE — TUBE SET INFLOW/OUTFLOW

## (undated) DEVICE — COVER MAYO STAND REINFRCD 30

## (undated) DEVICE — SPONGE COTTON TRAY 4X4IN

## (undated) DEVICE — SUPPORT ULNA NERVE PROTECTOR

## (undated) DEVICE — PEROXIDE HYDROGEN 3% 16OZ

## (undated) DEVICE — PACK SURGICAL KNEE SCOPE

## (undated) DEVICE — GLOVE SIGNATURE ESSNTL LTX 6

## (undated) DEVICE — GLOVE SIGNATURE ESSNTL LTX 8

## (undated) DEVICE — IMMOB KNEE UNIV TRI PANEL 19IN

## (undated) DEVICE — STRIP MEDI WND CLSR 1/2X4IN

## (undated) DEVICE — DRAPE U-DRAPE ADHESIVE 60X60IN

## (undated) DEVICE — PAD PREP CUFFED NS 24X48IN

## (undated) DEVICE — PAD ABDOMINAL STERILE 8X10IN

## (undated) DEVICE — DRAPE STERI U-SHAPED 47X51IN

## (undated) DEVICE — COVER TABLE HVY DTY 60X90IN